# Patient Record
Sex: FEMALE | Race: WHITE | NOT HISPANIC OR LATINO
[De-identification: names, ages, dates, MRNs, and addresses within clinical notes are randomized per-mention and may not be internally consistent; named-entity substitution may affect disease eponyms.]

---

## 2017-03-02 PROBLEM — Z00.00 ENCOUNTER FOR PREVENTIVE HEALTH EXAMINATION: Status: ACTIVE | Noted: 2017-03-02

## 2017-03-15 ENCOUNTER — APPOINTMENT (OUTPATIENT)
Dept: DERMATOLOGY | Facility: CLINIC | Age: 47
End: 2017-03-15

## 2017-04-20 ENCOUNTER — APPOINTMENT (OUTPATIENT)
Dept: DERMATOLOGY | Facility: CLINIC | Age: 47
End: 2017-04-20

## 2019-06-27 ENCOUNTER — APPOINTMENT (OUTPATIENT)
Dept: CARDIOLOGY | Facility: CLINIC | Age: 49
End: 2019-06-27
Payer: COMMERCIAL

## 2019-06-27 ENCOUNTER — NON-APPOINTMENT (OUTPATIENT)
Age: 49
End: 2019-06-27

## 2019-06-27 VITALS
DIASTOLIC BLOOD PRESSURE: 71 MMHG | BODY MASS INDEX: 36.48 KG/M2 | HEIGHT: 66 IN | WEIGHT: 227 LBS | SYSTOLIC BLOOD PRESSURE: 125 MMHG | OXYGEN SATURATION: 96 % | HEART RATE: 84 BPM

## 2019-06-27 DIAGNOSIS — Z82.49 FAMILY HISTORY OF ISCHEMIC HEART DISEASE AND OTHER DISEASES OF THE CIRCULATORY SYSTEM: ICD-10-CM

## 2019-06-27 DIAGNOSIS — F41.9 ANXIETY DISORDER, UNSPECIFIED: ICD-10-CM

## 2019-06-27 PROCEDURE — 93000 ELECTROCARDIOGRAM COMPLETE: CPT

## 2019-06-27 PROCEDURE — 99204 OFFICE O/P NEW MOD 45 MIN: CPT

## 2019-06-27 RX ORDER — PERPHENAZINE/AMITRIPTYLINE HCL 2 MG-25 MG
TABLET ORAL
Refills: 0 | Status: ACTIVE | COMMUNITY

## 2019-06-27 RX ORDER — ESCITALOPRAM OXALATE 5 MG/1
TABLET, FILM COATED ORAL
Refills: 0 | Status: ACTIVE | COMMUNITY

## 2019-06-27 NOTE — PHYSICAL EXAM
[General Appearance - Well Developed] : well developed [General Appearance - Well Nourished] : well nourished [Normal Conjunctiva] : the conjunctiva exhibited no abnormalities [Normal Oropharynx] : normal oropharynx [Normal Jugular Venous V Waves Present] : normal jugular venous V waves present [Heart Rate And Rhythm] : heart rate and rhythm were normal [] : no respiratory distress [Heart Sounds] : normal S1 and S2 [Murmurs] : no murmurs present [Respiration, Rhythm And Depth] : normal respiratory rhythm and effort [Auscultation Breath Sounds / Voice Sounds] : lungs were clear to auscultation bilaterally [Bowel Sounds] : normal bowel sounds [Abdomen Soft] : soft [Abnormal Walk] : normal gait [Abdomen Tenderness] : non-tender [Nail Clubbing] : no clubbing of the fingernails [Cyanosis, Localized] : no localized cyanosis [Skin Color & Pigmentation] : normal skin color and pigmentation [Skin Turgor] : normal skin turgor [Oriented To Time, Place, And Person] : oriented to person, place, and time [Affect] : the affect was normal [FreeTextEntry1] : no edema

## 2019-06-27 NOTE — HISTORY OF PRESENT ILLNESS
[FreeTextEntry1] : Patient is a 50yo F with obesity, HLD, family h/o CAD here for cardiac evaluation. HAd been on statin and did not tolerated, now off. When exercises feels heart pounding, or when goes up up stairs. Chronically fatigued. [employer] for [employer], . Legally , has teenage daughter. HAS been stressful year. No pnd/orthopnea/syncope/edema/claudication. \par \par ROS: GI negative, all others negative

## 2019-07-29 ENCOUNTER — APPOINTMENT (OUTPATIENT)
Dept: CARDIOLOGY | Facility: CLINIC | Age: 49
End: 2019-07-29
Payer: COMMERCIAL

## 2019-07-29 PROCEDURE — 93306 TTE W/DOPPLER COMPLETE: CPT

## 2019-07-29 RX ORDER — PERFLUTREN 6.52 MG/ML
6.52 INJECTION, SUSPENSION INTRAVENOUS
Qty: 2 | Refills: 0 | Status: COMPLETED | OUTPATIENT
Start: 2019-07-29

## 2019-07-29 RX ADMIN — PERFLUTREN MG/ML: 6.52 INJECTION, SUSPENSION INTRAVENOUS at 00:00

## 2019-07-30 ENCOUNTER — APPOINTMENT (OUTPATIENT)
Dept: CARDIOLOGY | Facility: CLINIC | Age: 49
End: 2019-07-30
Payer: COMMERCIAL

## 2019-07-30 PROCEDURE — 78452 HT MUSCLE IMAGE SPECT MULT: CPT

## 2019-07-30 PROCEDURE — 93015 CV STRESS TEST SUPVJ I&R: CPT

## 2019-07-30 PROCEDURE — A9500: CPT

## 2019-09-05 RX ORDER — KIT FOR THE PREPARATION OF TECHNETIUM TC99M SESTAMIBI 1 MG/5ML
INJECTION, POWDER, LYOPHILIZED, FOR SOLUTION PARENTERAL
Refills: 0 | Status: COMPLETED | OUTPATIENT
Start: 2019-09-05

## 2019-09-05 RX ADMIN — KIT FOR THE PREPARATION OF TECHNETIUM TC99M SESTAMIBI 0: 1 INJECTION, POWDER, LYOPHILIZED, FOR SOLUTION PARENTERAL at 00:00

## 2019-09-26 ENCOUNTER — APPOINTMENT (OUTPATIENT)
Dept: CARDIOLOGY | Facility: CLINIC | Age: 49
End: 2019-09-26
Payer: COMMERCIAL

## 2019-09-26 VITALS
DIASTOLIC BLOOD PRESSURE: 83 MMHG | HEART RATE: 74 BPM | HEIGHT: 66 IN | SYSTOLIC BLOOD PRESSURE: 133 MMHG | WEIGHT: 227 LBS | RESPIRATION RATE: 14 BRPM | BODY MASS INDEX: 36.48 KG/M2 | OXYGEN SATURATION: 96 %

## 2019-09-26 DIAGNOSIS — R00.2 PALPITATIONS: ICD-10-CM

## 2019-09-26 DIAGNOSIS — Z82.49 FAMILY HISTORY OF ISCHEMIC HEART DISEASE AND OTHER DISEASES OF THE CIRCULATORY SYSTEM: ICD-10-CM

## 2019-09-26 DIAGNOSIS — R94.31 ABNORMAL ELECTROCARDIOGRAM [ECG] [EKG]: ICD-10-CM

## 2019-09-26 DIAGNOSIS — E78.00 PURE HYPERCHOLESTEROLEMIA, UNSPECIFIED: ICD-10-CM

## 2019-09-26 DIAGNOSIS — E66.9 OBESITY, UNSPECIFIED: ICD-10-CM

## 2019-09-26 PROCEDURE — 93000 ELECTROCARDIOGRAM COMPLETE: CPT

## 2019-09-26 PROCEDURE — 99214 OFFICE O/P EST MOD 30 MIN: CPT

## 2019-09-26 RX ORDER — ROSUVASTATIN CALCIUM 5 MG/1
5 TABLET, FILM COATED ORAL
Refills: 0 | Status: ACTIVE | COMMUNITY

## 2019-09-26 RX ORDER — UBIDECARENONE/VIT E ACET 100MG-5
1000 CAPSULE ORAL
Refills: 0 | Status: ACTIVE | COMMUNITY

## 2019-09-26 NOTE — DISCUSSION/SUMMARY
[FreeTextEntry1] : Patient is a 50yo F with family h/o CAD here for cardiac evaluation. ECG with NSST (anteroseptal TWI). Does get exertional palpitations and h/o HLD, was controlled on statin but cannot take due to side effects. Stress test without ischemia and decent exercise tolerance. Echo shows normal biventricular function and no clinically significant valvular abnormalities. \par \par 1. Recommend aggressive diet and lifestyle modifications, counselled on weight loss \par 2. Recommend 30 minutes moderate intensity aerobic activity 5 days per week \par 3. Continue statin, tolerating well. \par 4. Annual follow up

## 2019-09-26 NOTE — HISTORY OF PRESENT ILLNESS
[FreeTextEntry1] : Patient is a 50yo F with obesity, HLD, family h/o CAD here for cardiac follow up.. HAd been on statin and did not tolerated, now off. When exercises feels heart pounding, or when goes up up stairs. Chronically fatigued. [employer]Orks for [employer], . Legally , has teenage daughter. HAS been stressful year.  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   No pnd/orthopnea/syncope/edema/claudication. Due to symptoms and ECG underwent cardiac testing. No new symptoms since last visit. On lower dose rosuvastatin 5mg qhs\par \par ROS: GI and  negative

## 2019-09-26 NOTE — PHYSICAL EXAM
[General Appearance - Well Developed] : well developed [General Appearance - Well Nourished] : well nourished [Normal Oropharynx] : normal oropharynx [Normal Conjunctiva] : the conjunctiva exhibited no abnormalities [Normal Jugular Venous V Waves Present] : normal jugular venous V waves present [] : no respiratory distress [Auscultation Breath Sounds / Voice Sounds] : lungs were clear to auscultation bilaterally [Respiration, Rhythm And Depth] : normal respiratory rhythm and effort [Heart Rate And Rhythm] : heart rate and rhythm were normal [Heart Sounds] : normal S1 and S2 [Murmurs] : no murmurs present [Bowel Sounds] : normal bowel sounds [Abdomen Soft] : soft [Abdomen Tenderness] : non-tender [Abnormal Walk] : normal gait [Nail Clubbing] : no clubbing of the fingernails [Cyanosis, Localized] : no localized cyanosis [Skin Turgor] : normal skin turgor [Skin Color & Pigmentation] : normal skin color and pigmentation [Oriented To Time, Place, And Person] : oriented to person, place, and time [Affect] : the affect was normal [FreeTextEntry1] : no edema

## 2019-09-26 NOTE — ASSESSMENT
[FreeTextEntry1] : ECG: SR, anteroseptal TWI \par \par  HDL 51 LDL 95 TG 95 (3/2019) \par \par EXERCISE NUCLEAR STRESS TEST 7/2019:\par 1. Negative for ischemia\par 2. EF 69%\par 3. Achieved 8 minutes, 9 METS\par 4. NOrmal HR/BP response\par \par ECHO 7/2019:\par 1. Normal LV size, systolic and diastolic function. EF 60-65%\par 2. Normal RV/LA/RA\par 3. Trivial MR

## 2020-10-31 ENCOUNTER — APPOINTMENT (OUTPATIENT)
Dept: DERMATOLOGY | Facility: CLINIC | Age: 50
End: 2020-10-31
Payer: COMMERCIAL

## 2020-10-31 PROCEDURE — 99072 ADDL SUPL MATRL&STAF TM PHE: CPT

## 2020-10-31 PROCEDURE — 17110 DESTRUCTION B9 LES UP TO 14: CPT

## 2020-10-31 PROCEDURE — 99203 OFFICE O/P NEW LOW 30 MIN: CPT | Mod: 25

## 2021-10-05 ENCOUNTER — APPOINTMENT (OUTPATIENT)
Dept: DERMATOLOGY | Facility: CLINIC | Age: 51
End: 2021-10-05
Payer: COMMERCIAL

## 2021-10-05 PROCEDURE — 17110 DESTRUCTION B9 LES UP TO 14: CPT

## 2021-10-05 PROCEDURE — 11102 TANGNTL BX SKIN SINGLE LES: CPT | Mod: 59

## 2021-10-05 PROCEDURE — 11103 TANGNTL BX SKIN EA SEP/ADDL: CPT | Mod: 59

## 2021-10-05 PROCEDURE — 99214 OFFICE O/P EST MOD 30 MIN: CPT | Mod: 25

## 2022-12-30 ENCOUNTER — APPOINTMENT (OUTPATIENT)
Dept: DERMATOLOGY | Facility: CLINIC | Age: 52
End: 2022-12-30
Payer: COMMERCIAL

## 2022-12-30 PROCEDURE — 99214 OFFICE O/P EST MOD 30 MIN: CPT | Mod: 25

## 2022-12-30 PROCEDURE — 17110 DESTRUCTION B9 LES UP TO 14: CPT

## 2024-09-10 ENCOUNTER — NON-APPOINTMENT (OUTPATIENT)
Age: 54
End: 2024-09-10

## 2024-10-08 ENCOUNTER — APPOINTMENT (OUTPATIENT)
Dept: DERMATOLOGY | Facility: CLINIC | Age: 54
End: 2024-10-08
Payer: COMMERCIAL

## 2024-10-08 PROCEDURE — 99214 OFFICE O/P EST MOD 30 MIN: CPT | Mod: 25

## 2024-10-08 PROCEDURE — 17110 DESTRUCTION B9 LES UP TO 14: CPT
